# Patient Record
Sex: FEMALE | Race: WHITE | NOT HISPANIC OR LATINO | Employment: FULL TIME | URBAN - METROPOLITAN AREA
[De-identification: names, ages, dates, MRNs, and addresses within clinical notes are randomized per-mention and may not be internally consistent; named-entity substitution may affect disease eponyms.]

---

## 2023-10-27 DIAGNOSIS — K59.03 DRUG-INDUCED CONSTIPATION: Primary | ICD-10-CM

## 2023-11-01 RX ORDER — POLYETHYLENE GLYCOL 3350 17 G/17G
POWDER, FOR SOLUTION ORAL
Qty: 510 G | Refills: 1 | Status: SHIPPED | OUTPATIENT
Start: 2023-11-01

## 2023-11-03 PROBLEM — M43.10 ANTEROLISTHESIS: Status: ACTIVE | Noted: 2023-11-03

## 2023-11-03 PROBLEM — M19.90 OSTEOARTHRITIS: Status: ACTIVE | Noted: 2023-11-03

## 2023-11-03 PROBLEM — M43.02 SPONDYLOLYSIS, CERVICAL REGION: Status: ACTIVE | Noted: 2023-11-03

## 2023-11-03 PROBLEM — C85.95: Status: ACTIVE | Noted: 2023-11-03

## 2023-11-03 PROBLEM — F17.200 NICOTINE DEPENDENCE: Status: ACTIVE | Noted: 2023-11-03

## 2023-11-03 PROBLEM — D64.9 ANEMIA: Status: ACTIVE | Noted: 2023-11-03

## 2023-11-03 PROBLEM — M19.049 OSTEOARTHRITIS OF HAND: Status: ACTIVE | Noted: 2023-11-03

## 2023-11-03 PROBLEM — M54.9 CHRONIC BACK PAIN: Status: ACTIVE | Noted: 2023-11-03

## 2023-11-03 PROBLEM — G89.29 CHRONIC BACK PAIN: Status: ACTIVE | Noted: 2023-11-03

## 2023-11-03 PROBLEM — R31.29 OTHER MICROSCOPIC HEMATURIA: Status: ACTIVE | Noted: 2023-11-03

## 2023-11-03 PROBLEM — M19.012 ARTHRITIS OF SHOULDER REGION, LEFT: Status: ACTIVE | Noted: 2023-11-03

## 2023-11-03 PROBLEM — H65.93 FLUID LEVEL BEHIND TYMPANIC MEMBRANE OF BOTH EARS: Status: ACTIVE | Noted: 2023-11-03

## 2023-11-03 PROBLEM — R23.3 BRUISES EASILY: Status: ACTIVE | Noted: 2023-11-03

## 2023-11-03 PROBLEM — M67.912 DYSFUNCTION OF LEFT ROTATOR CUFF: Status: ACTIVE | Noted: 2023-11-03

## 2023-11-03 PROBLEM — G45.9 TIA (TRANSIENT ISCHEMIC ATTACK): Status: ACTIVE | Noted: 2023-11-03

## 2023-11-03 PROBLEM — G62.9 NEUROPATHY: Status: ACTIVE | Noted: 2023-11-03

## 2023-11-03 PROBLEM — C82.90 FOLLICULAR LYMPHOMA (MULTI): Status: ACTIVE | Noted: 2023-11-03

## 2023-11-03 PROBLEM — F17.200 CURRENT EVERY DAY SMOKER: Status: ACTIVE | Noted: 2023-11-03

## 2023-11-03 PROBLEM — H91.93 HEARING LOSS, BILATERAL: Status: ACTIVE | Noted: 2023-11-03

## 2023-11-03 RX ORDER — MECLIZINE HYDROCHLORIDE 25 MG/1
25 TABLET ORAL 2 TIMES DAILY PRN
COMMUNITY

## 2023-11-03 RX ORDER — METHYLPREDNISOLONE 4 MG/1
TABLET ORAL
COMMUNITY
Start: 2023-08-10 | End: 2023-11-06 | Stop reason: ALTCHOICE

## 2023-11-03 RX ORDER — PANTOPRAZOLE SODIUM 40 MG/1
40 TABLET, DELAYED RELEASE ORAL DAILY
COMMUNITY
End: 2023-11-06 | Stop reason: SDUPTHER

## 2023-11-03 RX ORDER — PREDNISONE 10 MG/1
1 TABLET ORAL DAILY
COMMUNITY
Start: 2014-09-12 | End: 2023-11-06 | Stop reason: ALTCHOICE

## 2023-11-03 RX ORDER — ONDANSETRON 4 MG/1
4 TABLET, ORALLY DISINTEGRATING ORAL
COMMUNITY
End: 2023-11-06 | Stop reason: ALTCHOICE

## 2023-11-03 RX ORDER — PREGABALIN 150 MG/1
150 CAPSULE ORAL 3 TIMES DAILY
COMMUNITY
Start: 2014-11-18 | End: 2023-11-06 | Stop reason: SDUPTHER

## 2023-11-03 RX ORDER — ERGOCALCIFEROL 1.25 MG/1
1 CAPSULE ORAL
COMMUNITY
Start: 2022-01-06

## 2023-11-03 RX ORDER — PREDNISONE 5 MG/1
1 TABLET ORAL 2 TIMES DAILY
COMMUNITY
Start: 2023-08-09 | End: 2023-11-06 | Stop reason: SDUPTHER

## 2023-11-03 RX ORDER — NALOXONE HYDROCHLORIDE 4 MG/.1ML
4 SPRAY NASAL AS NEEDED
COMMUNITY
Start: 2023-10-20

## 2023-11-03 RX ORDER — SULFASALAZINE 500 MG/1
500 TABLET ORAL
COMMUNITY
Start: 2023-08-09 | End: 2023-11-06 | Stop reason: SDUPTHER

## 2023-11-03 RX ORDER — FOLIC ACID 1 MG/1
1 TABLET ORAL DAILY
COMMUNITY
Start: 2016-11-15 | End: 2023-11-06 | Stop reason: ALTCHOICE

## 2023-11-03 RX ORDER — OXYCODONE HCL 40 MG/1
40 TABLET, FILM COATED, EXTENDED RELEASE ORAL 3 TIMES DAILY
COMMUNITY
Start: 2007-10-20 | End: 2023-11-06 | Stop reason: SDUPTHER

## 2023-11-03 RX ORDER — NABUMETONE 500 MG/1
1.5 TABLET, FILM COATED ORAL EVERY 12 HOURS PRN
COMMUNITY
Start: 2017-01-06 | End: 2023-11-06 | Stop reason: SDUPTHER

## 2023-11-03 RX ORDER — DIAZEPAM 2 MG/1
2 TABLET ORAL
COMMUNITY
End: 2023-11-06 | Stop reason: ALTCHOICE

## 2023-11-06 ENCOUNTER — OFFICE VISIT (OUTPATIENT)
Dept: PRIMARY CARE | Facility: CLINIC | Age: 63
End: 2023-11-06
Payer: COMMERCIAL

## 2023-11-06 VITALS
TEMPERATURE: 98 F | HEART RATE: 81 BPM | HEIGHT: 66 IN | WEIGHT: 136 LBS | SYSTOLIC BLOOD PRESSURE: 132 MMHG | OXYGEN SATURATION: 93 % | DIASTOLIC BLOOD PRESSURE: 82 MMHG | BODY MASS INDEX: 21.86 KG/M2

## 2023-11-06 DIAGNOSIS — M15.9 PRIMARY OSTEOARTHRITIS INVOLVING MULTIPLE JOINTS: Primary | ICD-10-CM

## 2023-11-06 DIAGNOSIS — K21.9 GASTROESOPHAGEAL REFLUX DISEASE WITHOUT ESOPHAGITIS: ICD-10-CM

## 2023-11-06 DIAGNOSIS — F17.200 CURRENT EVERY DAY SMOKER: ICD-10-CM

## 2023-11-06 DIAGNOSIS — D50.9 IRON DEFICIENCY ANEMIA, UNSPECIFIED IRON DEFICIENCY ANEMIA TYPE: ICD-10-CM

## 2023-11-06 DIAGNOSIS — G89.29 CHRONIC BACK PAIN, UNSPECIFIED BACK LOCATION, UNSPECIFIED BACK PAIN LATERALITY: ICD-10-CM

## 2023-11-06 DIAGNOSIS — E55.9 VITAMIN D DEFICIENCY: ICD-10-CM

## 2023-11-06 DIAGNOSIS — M54.9 CHRONIC BACK PAIN, UNSPECIFIED BACK LOCATION, UNSPECIFIED BACK PAIN LATERALITY: ICD-10-CM

## 2023-11-06 PROCEDURE — 99214 OFFICE O/P EST MOD 30 MIN: CPT | Performed by: FAMILY MEDICINE

## 2023-11-06 RX ORDER — SULFASALAZINE 500 MG/1
500 TABLET ORAL DAILY
Qty: 90 TABLET | Refills: 0 | Status: SHIPPED | OUTPATIENT
Start: 2023-11-06 | End: 2024-01-03

## 2023-11-06 RX ORDER — PREGABALIN 150 MG/1
150 CAPSULE ORAL 3 TIMES DAILY
Qty: 270 CAPSULE | Refills: 0 | Status: SHIPPED | OUTPATIENT
Start: 2023-11-06 | End: 2024-03-14

## 2023-11-06 RX ORDER — OXYCODONE HCL 40 MG/1
40 TABLET, FILM COATED, EXTENDED RELEASE ORAL 3 TIMES DAILY
Qty: 90 TABLET | Refills: 0 | Status: SHIPPED | OUTPATIENT
Start: 2023-11-06

## 2023-11-06 RX ORDER — PREDNISONE 5 MG/1
5 TABLET ORAL 2 TIMES DAILY
Qty: 180 TABLET | Refills: 1 | Status: SHIPPED | OUTPATIENT
Start: 2023-11-06

## 2023-11-06 RX ORDER — NABUMETONE 500 MG/1
750 TABLET, FILM COATED ORAL EVERY 12 HOURS PRN
Qty: 180 TABLET | Refills: 0 | Status: SHIPPED | OUTPATIENT
Start: 2023-11-06 | End: 2024-01-08

## 2023-11-06 RX ORDER — PANTOPRAZOLE SODIUM 40 MG/1
40 TABLET, DELAYED RELEASE ORAL DAILY
Qty: 90 TABLET | Refills: 1 | Status: SHIPPED | OUTPATIENT
Start: 2023-11-06

## 2023-11-06 ASSESSMENT — PAIN SCALES - GENERAL: PAINLEVEL: 10-WORST PAIN EVER

## 2023-11-06 ASSESSMENT — PATIENT HEALTH QUESTIONNAIRE - PHQ9
2. FEELING DOWN, DEPRESSED OR HOPELESS: NOT AT ALL
SUM OF ALL RESPONSES TO PHQ9 QUESTIONS 1 AND 2: 0
1. LITTLE INTEREST OR PLEASURE IN DOING THINGS: NOT AT ALL

## 2023-11-06 NOTE — PROGRESS NOTES
Patient is here for concerns of her medication.  Her rheumatologist whom she had seen for many many years retired and she had followed up with pain management.  She was having substantial pain that was managed with her narcotic medication as well as her other medications and has been stable for a long time.  She is currently on strike from [employer] and so has been able to take a little bit easier.  Pain management did not wish to continue her oral medication and simply gave her a very short supply and very low milligram of immediate release oxycodone and not a proper weaning.  Patient is quite irate and distraught with the treatment and is trying to get in with a different doctor but not until January.  She started to have severe pain coming off of the medication.    REVIEW OF SYSTEMS: 12 systems negative except for those mentioned in the HPI.    PHYSICAL EXAMINATION:   Constitutional: The patient is alert, in no acute  distress.  Eyes: Extraocular movements are intact.   ENT: external ear canals patent  Neck: no  JVD.  Heart: no JVD  Lungs: Normal respiration without stridor or nasal flaring   Psychiatric: Good judgment and insight. Normal affect and mood.  Skin: no rashes or lesions  MSK: Severe thickening and ulnar deviation of the hands.      Assessment:  per EMR    Plan:  [facility] reviewed and appropriate.  Quite profoundly disturbed the patient is on 120 mg of extended release oxycodone daily and was only given 120 mg tablet twice a day and then trying to go down to 10 mg.  With the amount of length medication the patient is on she needs a much less taper to safely go down.  Discussed with the patient using to 40 mg tablets daily for the next 7 to 10 days and then going down to 140 mg tablet.  She can then maintain 40 mg once daily until she sees other physician or consider weaning off and breaking that in half.  She also was having a substantial amount of immediate release 10 mg oxycodones at 120 of these per 30  days from her prior physician.  Also refilled the prednisone and Lyrica and the Relafen.  Made appropriate referrals did review her drug screen which was appropriate but hopefully this will get the patient at least not going through active withdrawal.  Discussed the patient that this is out of my general scope of practice to maintain this chronically but is well within my scope of practice and comfortability to safely wean down patient's as I  have done in the past.  Patient was greatly appreciated of this and was understanding of the plan also understand that she would not be getting any further refills of oxycodone    This dictation was created using Dragon dictation and may contain errors

## 2023-11-06 NOTE — PROGRESS NOTES
Discuss health concerns   Take over meds   Unsure with oxycodone - wants to discuss   Finger pain 10/10

## 2023-11-07 ENCOUNTER — DOCUMENTATION (OUTPATIENT)
Dept: PRIMARY CARE | Facility: CLINIC | Age: 63
End: 2023-11-07
Payer: COMMERCIAL

## 2023-11-13 ENCOUNTER — TELEPHONE (OUTPATIENT)
Dept: PRIMARY CARE | Facility: CLINIC | Age: 63
End: 2023-11-13
Payer: COMMERCIAL

## 2023-11-13 NOTE — TELEPHONE ENCOUNTER
Pt called and said she needs her records sent to Dr Zambrano (RA referral in chart), can this be passed along to medical records

## 2024-02-12 DIAGNOSIS — G89.29 CHRONIC BACK PAIN, UNSPECIFIED BACK LOCATION, UNSPECIFIED BACK PAIN LATERALITY: ICD-10-CM

## 2024-02-12 DIAGNOSIS — M15.9 PRIMARY OSTEOARTHRITIS INVOLVING MULTIPLE JOINTS: ICD-10-CM

## 2024-02-12 DIAGNOSIS — M54.9 CHRONIC BACK PAIN, UNSPECIFIED BACK LOCATION, UNSPECIFIED BACK PAIN LATERALITY: ICD-10-CM

## 2024-02-13 RX ORDER — PREGABALIN 150 MG/1
150 CAPSULE ORAL 3 TIMES DAILY
Qty: 90 CAPSULE | Refills: 2 | OUTPATIENT
Start: 2024-02-13

## 2024-03-11 DIAGNOSIS — M15.9 PRIMARY OSTEOARTHRITIS INVOLVING MULTIPLE JOINTS: ICD-10-CM

## 2024-03-11 DIAGNOSIS — M54.9 CHRONIC BACK PAIN, UNSPECIFIED BACK LOCATION, UNSPECIFIED BACK PAIN LATERALITY: ICD-10-CM

## 2024-03-11 DIAGNOSIS — G89.29 CHRONIC BACK PAIN, UNSPECIFIED BACK LOCATION, UNSPECIFIED BACK PAIN LATERALITY: ICD-10-CM

## 2024-03-11 RX ORDER — NABUMETONE 500 MG/1
TABLET, FILM COATED ORAL
Qty: 90 TABLET | Refills: 0 | Status: SHIPPED | OUTPATIENT
Start: 2024-03-11

## 2024-03-14 DIAGNOSIS — M15.9 PRIMARY OSTEOARTHRITIS INVOLVING MULTIPLE JOINTS: ICD-10-CM

## 2024-03-14 DIAGNOSIS — M54.9 CHRONIC BACK PAIN, UNSPECIFIED BACK LOCATION, UNSPECIFIED BACK PAIN LATERALITY: ICD-10-CM

## 2024-03-14 DIAGNOSIS — G89.29 CHRONIC BACK PAIN, UNSPECIFIED BACK LOCATION, UNSPECIFIED BACK PAIN LATERALITY: ICD-10-CM

## 2024-03-14 RX ORDER — PREGABALIN 150 MG/1
150 CAPSULE ORAL 3 TIMES DAILY
Qty: 90 CAPSULE | Refills: 0 | Status: SHIPPED | OUTPATIENT
Start: 2024-03-14

## 2024-04-09 DIAGNOSIS — G89.29 CHRONIC BACK PAIN, UNSPECIFIED BACK LOCATION, UNSPECIFIED BACK PAIN LATERALITY: ICD-10-CM

## 2024-04-09 DIAGNOSIS — M54.9 CHRONIC BACK PAIN, UNSPECIFIED BACK LOCATION, UNSPECIFIED BACK PAIN LATERALITY: ICD-10-CM

## 2024-04-09 DIAGNOSIS — M15.9 PRIMARY OSTEOARTHRITIS INVOLVING MULTIPLE JOINTS: ICD-10-CM

## 2024-04-09 RX ORDER — NABUMETONE 500 MG/1
TABLET, FILM COATED ORAL
Qty: 90 TABLET | Refills: 0 | OUTPATIENT
Start: 2024-04-09

## 2024-04-14 DIAGNOSIS — M15.9 PRIMARY OSTEOARTHRITIS INVOLVING MULTIPLE JOINTS: ICD-10-CM

## 2024-04-14 DIAGNOSIS — G89.29 CHRONIC BACK PAIN, UNSPECIFIED BACK LOCATION, UNSPECIFIED BACK PAIN LATERALITY: ICD-10-CM

## 2024-04-14 DIAGNOSIS — M54.9 CHRONIC BACK PAIN, UNSPECIFIED BACK LOCATION, UNSPECIFIED BACK PAIN LATERALITY: ICD-10-CM

## 2024-04-15 ENCOUNTER — TELEPHONE (OUTPATIENT)
Dept: CARDIOLOGY | Facility: CLINIC | Age: 64
End: 2024-04-15
Payer: COMMERCIAL

## 2024-04-15 DIAGNOSIS — M15.9 PRIMARY OSTEOARTHRITIS INVOLVING MULTIPLE JOINTS: ICD-10-CM

## 2024-04-15 DIAGNOSIS — M54.9 CHRONIC BACK PAIN, UNSPECIFIED BACK LOCATION, UNSPECIFIED BACK PAIN LATERALITY: ICD-10-CM

## 2024-04-15 DIAGNOSIS — G89.29 CHRONIC BACK PAIN, UNSPECIFIED BACK LOCATION, UNSPECIFIED BACK PAIN LATERALITY: ICD-10-CM

## 2024-04-15 RX ORDER — PREGABALIN 150 MG/1
150 CAPSULE ORAL 3 TIMES DAILY
Qty: 90 CAPSULE | Refills: 0 | OUTPATIENT
Start: 2024-04-15

## 2024-05-11 DIAGNOSIS — M15.9 PRIMARY OSTEOARTHRITIS INVOLVING MULTIPLE JOINTS: ICD-10-CM

## 2024-05-11 DIAGNOSIS — G89.29 CHRONIC BACK PAIN, UNSPECIFIED BACK LOCATION, UNSPECIFIED BACK PAIN LATERALITY: ICD-10-CM

## 2024-05-11 DIAGNOSIS — M54.9 CHRONIC BACK PAIN, UNSPECIFIED BACK LOCATION, UNSPECIFIED BACK PAIN LATERALITY: ICD-10-CM

## 2024-05-13 RX ORDER — SULFASALAZINE 500 MG/1
500 TABLET ORAL DAILY
Qty: 90 TABLET | Refills: 1 | Status: SHIPPED | OUTPATIENT
Start: 2024-05-13

## 2024-05-15 DIAGNOSIS — G89.29 CHRONIC BACK PAIN, UNSPECIFIED BACK LOCATION, UNSPECIFIED BACK PAIN LATERALITY: ICD-10-CM

## 2024-05-15 DIAGNOSIS — M54.9 CHRONIC BACK PAIN, UNSPECIFIED BACK LOCATION, UNSPECIFIED BACK PAIN LATERALITY: ICD-10-CM

## 2024-05-15 DIAGNOSIS — M15.9 PRIMARY OSTEOARTHRITIS INVOLVING MULTIPLE JOINTS: ICD-10-CM

## 2024-05-15 RX ORDER — PREDNISONE 5 MG/1
5 TABLET ORAL 2 TIMES DAILY
Qty: 60 TABLET | Refills: 5 | OUTPATIENT
Start: 2024-05-15

## 2025-01-29 ENCOUNTER — OFFICE VISIT (OUTPATIENT)
Dept: PRIMARY CARE | Facility: CLINIC | Age: 65
End: 2025-01-29
Payer: COMMERCIAL

## 2025-01-29 VITALS
BODY MASS INDEX: 21.69 KG/M2 | WEIGHT: 135 LBS | HEART RATE: 114 BPM | DIASTOLIC BLOOD PRESSURE: 80 MMHG | SYSTOLIC BLOOD PRESSURE: 118 MMHG | TEMPERATURE: 98.3 F | HEIGHT: 66 IN

## 2025-01-29 DIAGNOSIS — G62.9 NEUROPATHY: ICD-10-CM

## 2025-01-29 DIAGNOSIS — E55.9 VITAMIN D DEFICIENCY: ICD-10-CM

## 2025-01-29 DIAGNOSIS — D50.9 IRON DEFICIENCY ANEMIA, UNSPECIFIED IRON DEFICIENCY ANEMIA TYPE: ICD-10-CM

## 2025-01-29 DIAGNOSIS — F17.200 CURRENT EVERY DAY SMOKER: ICD-10-CM

## 2025-01-29 DIAGNOSIS — G89.29 CHRONIC BACK PAIN, UNSPECIFIED BACK LOCATION, UNSPECIFIED BACK PAIN LATERALITY: ICD-10-CM

## 2025-01-29 DIAGNOSIS — G45.9 TIA (TRANSIENT ISCHEMIC ATTACK): ICD-10-CM

## 2025-01-29 DIAGNOSIS — C85.95: Primary | ICD-10-CM

## 2025-01-29 DIAGNOSIS — M54.9 CHRONIC BACK PAIN, UNSPECIFIED BACK LOCATION, UNSPECIFIED BACK PAIN LATERALITY: ICD-10-CM

## 2025-01-29 DIAGNOSIS — Z00.00 WELLNESS EXAMINATION: ICD-10-CM

## 2025-01-29 PROCEDURE — 3008F BODY MASS INDEX DOCD: CPT | Performed by: FAMILY MEDICINE

## 2025-01-29 PROCEDURE — 99214 OFFICE O/P EST MOD 30 MIN: CPT | Performed by: FAMILY MEDICINE

## 2025-01-29 RX ORDER — GABAPENTIN 300 MG/1
CAPSULE ORAL
COMMUNITY
Start: 2024-04-18

## 2025-01-29 ASSESSMENT — PATIENT HEALTH QUESTIONNAIRE - PHQ9
1. LITTLE INTEREST OR PLEASURE IN DOING THINGS: NOT AT ALL
2. FEELING DOWN, DEPRESSED OR HOPELESS: NOT AT ALL
SUM OF ALL RESPONSES TO PHQ9 QUESTIONS 1 AND 2: 0

## 2025-01-29 NOTE — PROGRESS NOTES
Patient is here just needs her handicap placard for the for plan.  She is planning on retiring after next year.  Due to her severe degenerative joint disease she has a normal plaque her anyway.  She also says she has gotten a lot easier to bruise and thin skin.    REVIEW OF SYSTEMS: 12 systems negative except for those mentioned in the HPI.    PHYSICAL EXAMINATION:   Constitutional: The patient is alert, in no acute  distress.  Eyes: Extraocular movements are intact.   ENT: external ear canals patent  Neck: no  JVD.  Heart: no JVD  Lungs: Normal respiration without stridor or nasal flaring   Psychiatric: Good judgment and insight. Normal affect and mood.  Skin: no rashes or lesions    Assessment:  per EMR    Plan:  Forms are filled out and had been for many years scanned in copy.  Will go ahead and have CBC CMP A1c lipid panel thyroid iron which she has had iron deficiency in the past as well as also rheumatological panel.  Call for results and follow-up    This dictation was created using Dragon dictation and may contain errors

## 2025-02-12 ENCOUNTER — OFFICE VISIT (OUTPATIENT)
Dept: PRIMARY CARE | Facility: CLINIC | Age: 65
End: 2025-02-12
Payer: COMMERCIAL

## 2025-02-12 VITALS
DIASTOLIC BLOOD PRESSURE: 84 MMHG | BODY MASS INDEX: 22.82 KG/M2 | WEIGHT: 142 LBS | HEIGHT: 66 IN | SYSTOLIC BLOOD PRESSURE: 132 MMHG | TEMPERATURE: 97.8 F | HEART RATE: 78 BPM

## 2025-02-12 DIAGNOSIS — G89.29 CHRONIC BACK PAIN, UNSPECIFIED BACK LOCATION, UNSPECIFIED BACK PAIN LATERALITY: ICD-10-CM

## 2025-02-12 DIAGNOSIS — M15.0 PRIMARY OSTEOARTHRITIS INVOLVING MULTIPLE JOINTS: ICD-10-CM

## 2025-02-12 DIAGNOSIS — C85.95: Primary | ICD-10-CM

## 2025-02-12 DIAGNOSIS — M54.9 CHRONIC BACK PAIN, UNSPECIFIED BACK LOCATION, UNSPECIFIED BACK PAIN LATERALITY: ICD-10-CM

## 2025-02-12 DIAGNOSIS — G62.9 NEUROPATHY: ICD-10-CM

## 2025-02-12 DIAGNOSIS — J43.2 CENTRILOBULAR EMPHYSEMA (MULTI): ICD-10-CM

## 2025-02-12 DIAGNOSIS — G45.9 TIA (TRANSIENT ISCHEMIC ATTACK): ICD-10-CM

## 2025-02-12 DIAGNOSIS — M06.9 RHEUMATOID ARTHRITIS INVOLVING BOTH HANDS, UNSPECIFIED WHETHER RHEUMATOID FACTOR PRESENT (MULTI): ICD-10-CM

## 2025-02-12 PROCEDURE — 99213 OFFICE O/P EST LOW 20 MIN: CPT | Performed by: FAMILY MEDICINE

## 2025-02-12 PROCEDURE — 3008F BODY MASS INDEX DOCD: CPT | Performed by: FAMILY MEDICINE

## 2025-02-12 RX ORDER — ALBUTEROL SULFATE 90 UG/1
2 INHALANT RESPIRATORY (INHALATION) EVERY 4 HOURS PRN
COMMUNITY
Start: 2025-02-05

## 2025-02-12 RX ORDER — INHALER, ASSIST DEVICES
SPACER (EA) MISCELLANEOUS
COMMUNITY
Start: 2025-02-10

## 2025-02-12 RX ORDER — PREDNISONE 5 MG/1
5 TABLET ORAL 2 TIMES DAILY
Qty: 180 TABLET | Refills: 0 | Status: SHIPPED | OUTPATIENT
Start: 2025-02-12

## 2025-02-12 RX ORDER — BENZONATATE 100 MG/1
100 CAPSULE ORAL EVERY 8 HOURS PRN
COMMUNITY
Start: 2025-02-05 | End: 2025-02-12

## 2025-02-12 RX ORDER — DOXYCYCLINE 100 MG/1
100 CAPSULE ORAL 2 TIMES DAILY
COMMUNITY
Start: 2025-02-05 | End: 2025-02-12 | Stop reason: WASHOUT

## 2025-02-12 ASSESSMENT — PATIENT HEALTH QUESTIONNAIRE - PHQ9
1. LITTLE INTEREST OR PLEASURE IN DOING THINGS: NOT AT ALL
SUM OF ALL RESPONSES TO PHQ9 QUESTIONS 1 AND 2: 0
2. FEELING DOWN, DEPRESSED OR HOPELESS: NOT AT ALL

## 2025-02-12 NOTE — PROGRESS NOTES
Patient following up if she had influenza A and then had pneumonia.  She is feeling pretty much back to normal just a little bit of cough and congestion but she wanted to double check and make sure everything was still good.  Patient also requests a refill of her prednisone.  She is also not smoked in 9 days    REVIEW OF SYSTEMS: 12 systems negative except for those mentioned in the HPI.    PHYSICAL EXAMINATION:   Constitutional: The patient is alert, in no acute  distress.  Eyes: Extraocular movements are intact. Pupils are equal and reactive to light  ENT: TMs are clear  Neck: Supple without lymphadenopathy or JVD.  Heart: Regular rate and rhythm without murmur, click, gallop, or rub.  Lungs: Clear to auscultation bilaterally. No rales, rhonchi, or  wheezing.  Psychiatric: Good judgment and insight. Normal affect and mood.  Lymphatic: as noted above.  Skin: no rashes or lesions    Assessment:  per EMR    Plan:  I reviewed workup and chest x-ray.  Had influenza A pneumonia was treated with doxycycline and has resolved.  Discussed the patient do not  smoking again.  Refill prednisone follow-up as needed    This dictation was created using Dragon dictation and may contain errors

## 2025-05-26 DIAGNOSIS — M54.9 CHRONIC BACK PAIN, UNSPECIFIED BACK LOCATION, UNSPECIFIED BACK PAIN LATERALITY: ICD-10-CM

## 2025-05-26 DIAGNOSIS — G89.29 CHRONIC BACK PAIN, UNSPECIFIED BACK LOCATION, UNSPECIFIED BACK PAIN LATERALITY: ICD-10-CM

## 2025-05-26 DIAGNOSIS — M15.0 PRIMARY OSTEOARTHRITIS INVOLVING MULTIPLE JOINTS: ICD-10-CM

## 2025-06-09 RX ORDER — PREDNISONE 5 MG/1
5 TABLET ORAL 2 TIMES DAILY
Qty: 60 TABLET | Refills: 2 | OUTPATIENT
Start: 2025-06-09

## 2025-06-25 DIAGNOSIS — Z12.31 ENCOUNTER FOR SCREENING MAMMOGRAM FOR BREAST CANCER: ICD-10-CM
